# Patient Record
Sex: MALE | URBAN - METROPOLITAN AREA
[De-identification: names, ages, dates, MRNs, and addresses within clinical notes are randomized per-mention and may not be internally consistent; named-entity substitution may affect disease eponyms.]

---

## 2018-02-16 ENCOUNTER — TRANSFERRED RECORDS (OUTPATIENT)
Dept: HEALTH INFORMATION MANAGEMENT | Facility: CLINIC | Age: 53
End: 2018-02-16

## 2018-03-20 ENCOUNTER — TRANSFERRED RECORDS (OUTPATIENT)
Dept: HEALTH INFORMATION MANAGEMENT | Facility: CLINIC | Age: 53
End: 2018-03-20

## 2018-07-03 DIAGNOSIS — H90.8 MIXED HEARING LOSS: Primary | ICD-10-CM

## 2018-07-05 NOTE — TELEPHONE ENCOUNTER
FUTURE VISIT INFORMATION      FUTURE VISIT INFORMATION:    Date: 7-10-18    Time:     Location:   REFERRAL INFORMATION:    Referring provider:  Dr Ney Bacon    Referring providers clinic:  [place] [facility]    Reason for visit/diagnosis  hearing loss     RECORDS REQUESTED FROM:       Clinic name Comments Records Status Imaging Status   [facility]  requested                                    RECORDS STATUS

## 2018-07-10 ENCOUNTER — PRE VISIT (OUTPATIENT)
Dept: OTOLARYNGOLOGY | Facility: CLINIC | Age: 53
End: 2018-07-10

## 2018-07-10 ENCOUNTER — OFFICE VISIT (OUTPATIENT)
Dept: OTOLARYNGOLOGY | Facility: CLINIC | Age: 53
End: 2018-07-10
Payer: COMMERCIAL

## 2018-07-10 ENCOUNTER — OFFICE VISIT (OUTPATIENT)
Dept: AUDIOLOGY | Facility: CLINIC | Age: 53
End: 2018-07-10
Attending: OTOLARYNGOLOGY
Payer: COMMERCIAL

## 2018-07-10 DIAGNOSIS — H61.23 EXCESSIVE CERUMEN IN BOTH EAR CANALS: ICD-10-CM

## 2018-07-10 DIAGNOSIS — H90.3 BILATERAL SENSORINEURAL HEARING LOSS: Primary | ICD-10-CM

## 2018-07-10 DIAGNOSIS — H90.3 SENSORINEURAL HEARING LOSS, BILATERAL: ICD-10-CM

## 2018-07-10 ASSESSMENT — PAIN SCALES - GENERAL: PAINLEVEL: NO PAIN (0)

## 2018-07-10 NOTE — PATIENT INSTRUCTIONS
You were seen today by doctor Nissen for Hearing loss. The Doctor Reccommends a cochlear  Implant consult with our audiology department and has referred you to Doctor Andrews for a surgical consult.  Luis Mosquera LPN

## 2018-07-10 NOTE — MR AVS SNAPSHOT
After Visit Summary   7/10/2018    Dusty Bhatt    MRN: [id number]           Patient Information     Date Of Birth          1965        Visit Information        Provider Department      7/10/2018 11:15 AM Nissen, Rick L, MD [facility]        Care Instructions    You were seen today by doctor Nissen for Hearing loss. The Doctor Reccommends a cochlear  Implant consult with our audiology department and has referred you to Doctor Andrews for a surgical consult.  Luis Mosquera LPN            Follow-ups after your visit        Your next 10 appointments already scheduled     Jul 18, 2018  8:30 AM CDT   (Arrive by 8:15 AM)   Cochlear Implant Extended Visit with [facility] Audiology ([facility] [place])    [address]  [address] [facility] [address] [phone]           Ensure your hearing aids are functioning and bring them to the appointment. They are necessary to complete the evaluation. You MUST bring a physician order for your exam, signed by your doctor. Your doctor can send an electronic order, use their own form or we have provided a form (called Physician's Order for Audiology Services). It states that there is a medical reason for your exam. Without an order you may need to be rescheduled until an order can be obtained.                                       Note to Medicare Patients: Medicare does not cover the removal of earwax at our office. Medicare will pay for earwax removal if your doctor does it. Your hearing aids will be checked during the evaluation visit. Medicare does not pay for the cost of any hearing aid service.              Who to contact     Please call your clinic at [phone] to:    Ask questions about your health    Make or cancel appointments    Discuss your medicines    Learn about your test results    Speak to your doctor            Additional Information About Your Visit        Care EveryWhere  ID     This is your Care EveryWhere ID. This could be used by other organizations to access your [place] medical records  [id number]         Blood Pressure from Last 3 Encounters:   No data found for BP    Weight from Last 3 Encounters:   No data found for Wt              Today, you had the following     No orders found for display       Primary Care Provider Office Phone # Fax #    Cristina Gottlieb [phone]       [place] [address] [facility] [address]        Equal Access to Services     GRISELDA ARNDT : Hadii aad ku hadasho Soomaali, waaxda luqadaha, qaybta kaalmada adeegyada, waxay idiin hayaan adeeg khararizwana ladorcas . So [facility] [phone].    ATENCIÓN: Si habla español, tiene a canales disposición servicios gratuitos de asistencia lingüística. [place] [phone].    We comply with applicable federal civil rights laws and [place] laws. We do not discriminate on the basis of race, color, national origin, age, disability, sex, sexual orientation, or gender identity.            Thank you!     Thank you for choosing [facility] EAR NOSE AND THROAT  for your care. Our goal is always to provide you with excellent care. Hearing back from our patients is one way we can continue to improve our services. Please take a few minutes to complete the written survey that you may receive in the mail after your visit with us. Thank you!             Your Updated Medication List - Protect others around you: Learn how to safely use, store and throw away your medicines at www.disposemymeds.org.          This list is accurate as of 7/10/18 11:57 AM.  Always use your most recent med list.                   Brand Name Dispense Instructions for use Diagnosis    budesonide-formoterol 160-4.5 MCG/ACT Inhaler    SYMBICORT     Inhale 2 puffs into the lungs 2 times daily

## 2018-07-10 NOTE — PROGRESS NOTES
Dear Cristina Partida:    I had the pleasure of meeting Dusty Bhatt in consultation today at the [facility] at your request.    HISTORY OF PRESENT ILLNESS: Patient is a 53-year-old in today for assessment of hearing loss.  He has had a severe sensorineural hearing loss his whole life, he thinks he first obtained hearing aids in the fifth grade.  He has been in speech therapy since even before that.  Both parents are deaf and went through the school for deaf when they are younger.  He has 1 daughter who has profound hearing loss and wears hearing aids.  4 siblings, they all have good hearing.  He has not had any ear pain or ear drainage.  He denies any dizziness.  No tinnitus.  Further denies any dysphasia, hoarseness, facial paresthesias.  He is in excellent health on no medications.  Does not smoke.  He lip reads and has gotten very good at that, function and gets along but has profound hearing loss.      ALLERGIES:    Allergies   Allergen Reactions     Levofloxacin Swelling     No Clinical Screening - See Comments      Pine nut     Shellfish-Derived Products Difficulty breathing       HABITS:   Alcohol use Yes    History   Smoking Status     Former Smoker     Packs/day: 0.50     Years: 10.00     Types: Cigarettes     Start date: 10/1/1982     Quit date: 6/16/1994   Smokeless Tobacco     Never Used         PAST MEDICAL HISTORY: Please see today's intake form (for the remainder of the PMH) which I reviewed and signed.  Past Medical History:   Diagnosis Date     Allergic rhinitis      Conductive hearing loss      Sensorineural hearing loss        FAMILY HISTORY/SOCIAL HISTORY:   Family History   Problem Relation Age of Onset     Substance Abuse Father      Cerebrovascular Disease Father      Substance Abuse Brother      Asthma Sister      Cancer Mother      Cerebrovascular Disease Sister      Stomach Problem Sister     Social History     Social History     Marital status:       Spouse name: N/A     Number of children: N/A     Years of education: N/A     Occupational History     Not on file.     Social History Main Topics     Smoking status: Former Smoker     Packs/day: 0.50     Years: 10.00     Types: Cigarettes     Start date: 10/1/1982     Quit date: 6/16/1994     Smokeless tobacco: Never Used     Alcohol use Yes     Drug use: No     Sexual activity: Yes     Partners: Female     Birth control/ protection: Male Surgical     Other Topics Concern     Not on file     Social History Narrative     No narrative on file       REVIEW OF SYSTEMS:   Patient Supplied Answers to Review of Systems   ENT [facility] 7/10/2018   Allergy/Immunology Allergies or hay fever     The remainder of the 10 point ROS exam is negative    PHYSICIAL EXAMINATION:  Constitutional: The patient was well-groomed and in no acute distress.   Skin: Warm and pink.  Psychiatric: The patient's affect was calm, cooperative, and appropriate.   Respiratory: Breathing comfortably without stridor or exertion of accessory muscles.  Eyes: Pupils were equal and reactive. Extraocular movement intact.   Head: Normocephalic and atraumatic. No lesions or scars.  Ears:   Both ears examined he does have bilateral cerumen.  He was taken to the microscope and under high-power magnification the right side was cleaned with curettes.  Following cleaning, TM and middle ear looked normal. The opposite ear was cleaned and examined using microscope, curette, and similar techniques.  TM and middle ear looked normal after cleaning.  Nose: Sinuses were nontender. Anterior rhinoscopy revealed midline septum and absence of purulence or polyps.  Oral Cavity: Normal tongue, floor of moth, buccal mucosa, and palate. No lesions or masses on inspection or palpation. No abnormal lymph tissue in the oropharynx.   Neck: The parotid is soft without masses. Supple with normal laryngeal and tracheal landmarks.   Lymphatic: There is no palpable lymphadenopathy or  other masses in the neck.   Neurologic: Alert and oriented x 3. Cranial nerves III-XI within normal limits. Voice quality normal.  Cerebellar Function Tests:  Grossly normal    Audiogram: audiogram performed shows a profound hearing loss in both ears, 35% discrimination on the right and 33% on the left.  Normal tympanograms.    IMPRESSION AND PLAN: Discussed with him his profound hearing loss.  He again functions fairly well as long as he can read lips.  We discussed option for hearing aids as well as I feel he is a candidate to consider cochlear implants.  With his family history of feel the hearing loss is congenital.  He is somewhat interested in pursuing option for cochlear implant and we will get him in for cochlear implant consult and assessment.  Otherwise I would continue to monitor and see him every year to 2.  He will continue with his hearing aids at this point.    Thank you very much for the opportunity to participate in the care of your patient.    Rick L Nissen MD

## 2018-07-10 NOTE — PROGRESS NOTES
AUDIOLOGY REPORT    SUMMARY: Audiology visit completed. See audiogram for results.      RECOMMENDATIONS: Follow-up with ENT.    Adelita Arnold, [facility]  Licensed Audiologist  [place] License #[id number]

## 2018-07-10 NOTE — LETTER
7/10/2018       RE: Dusty Bhatt  [address] [address] [place] [facility] [address]     Dear Colleague,    Thank you for referring your patient, Dusty Bhatt, to the [facility] at [facility]. Please see a copy of my visit note below.    Dear Cristina Partida:    I had the pleasure of meeting Dusty Bhatt in consultation today at the [facility] at your request.    HISTORY OF PRESENT ILLNESS: Patient is a 53-year-old in today for assessment of hearing loss.  He has had a severe sensorineural hearing loss his whole life, he thinks he first obtained hearing aids in the fifth grade.  He has been in speech therapy since even before that.  Both parents are deaf and went through the school for deaf when they are younger.  He has 1 daughter who has profound hearing loss and wears hearing aids.  4 siblings, they all have good hearing.  He has not had any ear pain or ear drainage.  He denies any dizziness.  No tinnitus.  Further denies any dysphasia, hoarseness, facial paresthesias.  He is in excellent health on no medications.  Does not smoke.  He lip reads and has gotten very good at that, function and gets along but has profound hearing loss.      ALLERGIES:    Allergies   Allergen Reactions     Levofloxacin Swelling     No Clinical Screening - See Comments      Pine nut     Shellfish-Derived Products Difficulty breathing       HABITS:   Alcohol use Yes    History   Smoking Status     Former Smoker     Packs/day: 0.50     Years: 10.00     Types: Cigarettes     Start date: 10/1/1982     Quit date: 6/16/1994   Smokeless Tobacco     Never Used         PAST MEDICAL HISTORY: Please see today's intake form (for the remainder of the PMH) which I reviewed and signed.  Past Medical History:   Diagnosis Date     Allergic rhinitis      Conductive hearing loss      Sensorineural hearing loss        FAMILY HISTORY/SOCIAL HISTORY:   Family History   Problem  Relation Age of Onset     Substance Abuse Father      Cerebrovascular Disease Father      Substance Abuse Brother      Asthma Sister      Cancer Mother      Cerebrovascular Disease Sister      Stomach Problem Sister     Social History     Social History     Marital status:      Spouse name: N/A     Number of children: N/A     Years of education: N/A     Occupational History     Not on file.     Social History Main Topics     Smoking status: Former Smoker     Packs/day: 0.50     Years: 10.00     Types: Cigarettes     Start date: 10/1/1982     Quit date: 6/16/1994     Smokeless tobacco: Never Used     Alcohol use Yes     Drug use: No     Sexual activity: Yes     Partners: Female     Birth control/ protection: Male Surgical     Other Topics Concern     Not on file     Social History Narrative     No narrative on file       REVIEW OF SYSTEMS: Please see today's intake form (for the remainder of the ROS) which I have reviewed and signed.    PHYSICIAL EXAMINATION:  Constitutional: The patient was well-groomed and in no acute distress.   Skin: Warm and pink.  Psychiatric: The patient's affect was calm, cooperative, and appropriate.   Respiratory: Breathing comfortably without stridor or exertion of accessory muscles.  Eyes: Pupils were equal and reactive. Extraocular movement intact.   Head: Normocephalic and atraumatic. No lesions or scars.  Ears:   Both ears examined he does have bilateral cerumen.  He was taken to the microscope and under high-power magnification the right side was cleaned with curettes.  Following cleaning, TM and middle ear looked normal. The opposite ear was cleaned and examined using microscope, curette, and similar techniques.  TM and middle ear looked normal after cleaning.  Nose: Sinuses were nontender. Anterior rhinoscopy revealed midline septum and absence of purulence or polyps.  Oral Cavity: Normal tongue, floor of moth, buccal mucosa, and palate. No lesions or masses on inspection or  palpation. No abnormal lymph tissue in the oropharynx.   Neck: The parotid is soft without masses. Supple with normal laryngeal and tracheal landmarks.   Lymphatic: There is no palpable lymphadenopathy or other masses in the neck.   Neurologic: Alert and oriented x 3. Cranial nerves III-XI within normal limits. Voice quality normal.  Cerebellar Function Tests:  Grossly normal    Audiogram: audiogram performed shows a profound hearing loss in both ears, 35% discrimination on the right and 33% on the left.  Normal tympanograms.    IMPRESSION AND PLAN: Discussed with him his profound hearing loss.  He again functions fairly well as long as he can read lips.  We discussed option for hearing aids as well as I feel he is a candidate to consider cochlear implants.  With his family history of feel the hearing loss is congenital.  He is somewhat interested in pursuing option for cochlear implant and we will get him in for cochlear implant consult and assessment.  Otherwise I would continue to monitor and see him every year to 2.  He will continue with his hearing aids at this point.    Thank you very much for the opportunity to participate in the care of your patient.      Again, thank you for allowing me to participate in the care of your patient.      Sincerely,    Rick L. Nissen, MD

## 2018-07-18 ENCOUNTER — OFFICE VISIT (OUTPATIENT)
Dept: AUDIOLOGY | Facility: CLINIC | Age: 53
End: 2018-07-18
Payer: COMMERCIAL

## 2018-07-18 DIAGNOSIS — H90.3 SENSORY HEARING LOSS, BILATERAL: Primary | ICD-10-CM

## 2018-07-18 NOTE — PROGRESS NOTES
AUDIOLOGY REPORT    PURPOSE: To evaluate candidacy for cochlear implant (CI). Candidacy requires at least a moderate to profound sensorineural hearing loss in both ears, good general health, lack of benefit from conventional amplification as determined from the tests below, psychological/emotional stability and motivationally suitable, with realistic expectations, and absence of medical conditions contraindicating surgical intervention.     BACKGROUND:  Dusty Bhatt was seen on 7/18/2018 in Audiology at the [facility] for a cochlear implant evaluation, which was ordered by Dr. Rick Nissen.   Dusty Bhatt has been diagnosed with progressive profound bilateral hearing loss.      Onset of hearing loss: About age 4 years  Identification of hearing loss: About age 8 years  Onset of profound hearing loss: About age 6 years  Etiology of hearing loss: Heredity, parents both are deaf; daughter has hearing loss.  Sudden or Progressive: Progressive  Age Hearing Aid fit: About age 8 years (first right and then left)  Duration of Hearing Aid use: Full time  Current Hearing Aid Type: Latanya MATHEUS  Age of current Hearing Aid: 4 Years  Tinnitus: Absent  Balance: Not a problem  Other:  Lots of allergies, and gets shots.  Does patient exhibit intelligible vocalizations?  Yes  Primary Mode of Communication: Oral/aural/lipreading; knows some sign language  Previous Surgeries: None  Previous Ear Surgeries: None    Fall Risk Screen:  1. Have you fallen two or more times in the past year? No  2. Have you fallen and had an injury in the past year? No    Timed Up and Go Score (in seconds): not tested    Is patient a fall risk? No  Referral initiated: No  Fall Risk Assessment Completed by Audiology.    TEST RESULTS:    Audiometric Results (see audiogram): Profound bilateral sensorineural hearing loss.  Tested 7/10/2018  Tympanograms: normal eardrum mobility bilaterally  Acoustic Reflexes:  Absent bilaterally  Otoacoustic emissions: DNT as he did not qualify    Devices used for Aided Testing:   Right ear: Latanya RICs (long canal earmolds)  Left ear: Latanya RICs (long canal earmolds)    Electroacoustic Test Results: Hearing aids were checked electroacoustically and with simulated real ear measures.  It was very close to target.  Due to the size of his ear canals, loaner instruments were not possible    40 minutes were spent assessing the patient s auditory rehabilitation status in order to determine whether conventional amplification is beneficial or whether the patient is an audiologic candidate for a cochlear implant.      Soundfield Thresholds (see audiogram): Responses at moderate hearing loss levels    CNC Words Test: The patient repeats 25 single syllable words, auditory only. The words are presented at 60 dB SPL (conversational level) delivered from a CD player.     Left ear aided: 32%  Right ear aided: 12%  Bilaterally aided: 44%    AzBio Sentences Test: The patient repeats 20 sentences, auditory only.  The sentences are presented at 60 dB SPL (conversational level) delivered from a CD player.       Left ear aided: 61%  Right ear aided: 20%  Bilaterally aided: 74% in quiet; 68% in background babble (signal/noise 10 dB)    EAR RECOMMENDED FOR IMPLANTATION: Neither    REASON: His scores are to high to qualify    COMMENTS: He was given a lot of information on the criteria for cochlear implant qualifications, and why he is not a candidate.  A general discussion of the cochlear implant process and surgery was was presented, which includes the  Surgery process, possible side affects, the range of performance with cochlear implant use, and the cochlear implant companies.  He was also given an information form on local hearing loss services to investigate.    SUMMARY AND RECOMMENDATIONS: Mr. Bhatt was seen for a cochlear implant evaluation.  His hearing aids were checked to determine if they were  adjusted for his hearing loss, and then performance evaluated.  At this time his scores are too high to qualify.  He was encouraged to return if change is noted, and to investigate in a few years if criteria has changed.  Questions were answered.    Adelita Negrete., CCC-A  Licensed Audiologist  [place] #[id number]

## 2018-07-18 NOTE — MR AVS SNAPSHOT
After Visit Summary   7/18/2018    Dusty Bhatt    MRN: [id number]           Patient Information     Date Of Birth          1965        Visit Information        Provider Department      7/18/2018 8:30 AM Jazmyn Villegas, [facility] Audiology        Today's Diagnoses     Sensory hearing loss, bilateral    -  1       Follow-ups after your visit        Who to contact     Please call your clinic at [phone] to:    Ask questions about your health    Make or cancel appointments    Discuss your medicines    Learn about your test results    Speak to your doctor            Additional Information About Your Visit        Care EveryWhere ID     This is your Care EveryWhere ID. This could be used by other organizations to access your [place] medical records  [id number]         Blood Pressure from Last 3 Encounters:   No data found for BP    Weight from Last 3 Encounters:   No data found for Wt              We Performed the Following     AUDIOGRAM/TYMPANOGRAM - INTERFACE     Electro Acoustic Hearing Aid Test, Binaural (74185)     Eval of Aud Rehab Status (60 min)   (77765)        Primary Care Provider Office Phone # Fax #    Cristina Bull [phone]       [address] [address]        Equal Access to Services     [facility] AH: Hadii aad ku hadasho Soomaali, waaxda luqadaha, qaybta kaalmada adeegyada, tarsha fischer haysavage robles . So [facility] [phone].    ATENCIÓN: Si habla español, tiene a canales disposición servicios gratuitos de asistencia lingüística. [facility] [phone].    We comply with applicable federal civil rights laws and [place] laws. We do not discriminate on the basis of race, color, national origin, age, disability, sex, sexual orientation, or gender identity.            Thank you!     Thank you for choosing [facility] AUDIOLOGY  for your care. Our goal is always to provide you with excellent care. Hearing back from  our patients is one way we can continue to improve our services. Please take a few minutes to complete the written survey that you may receive in the mail after your visit with us. Thank you!             Your Updated Medication List - Protect others around you: Learn how to safely use, store and throw away your medicines at www.disposemymeds.org.          This list is accurate as of 7/18/18 10:56 AM.  Always use your most recent med list.                   Brand Name Dispense Instructions for use Diagnosis    budesonide-formoterol 160-4.5 MCG/ACT Inhaler    SYMBICORT     Inhale 2 puffs into the lungs 2 times daily